# Patient Record
Sex: FEMALE | Race: BLACK OR AFRICAN AMERICAN | NOT HISPANIC OR LATINO | Employment: FULL TIME | URBAN - METROPOLITAN AREA
[De-identification: names, ages, dates, MRNs, and addresses within clinical notes are randomized per-mention and may not be internally consistent; named-entity substitution may affect disease eponyms.]

---

## 2019-12-02 ENCOUNTER — OFFICE VISIT (OUTPATIENT)
Dept: GASTROENTEROLOGY | Facility: CLINIC | Age: 69
End: 2019-12-02
Payer: COMMERCIAL

## 2019-12-02 VITALS
HEART RATE: 74 BPM | SYSTOLIC BLOOD PRESSURE: 120 MMHG | DIASTOLIC BLOOD PRESSURE: 72 MMHG | WEIGHT: 175.94 LBS | HEIGHT: 65 IN | BODY MASS INDEX: 29.31 KG/M2

## 2019-12-02 DIAGNOSIS — Z90.49 STATUS POST CHOLECYSTECTOMY: ICD-10-CM

## 2019-12-02 DIAGNOSIS — R93.2 ABNORMAL MRI, LIVER: ICD-10-CM

## 2019-12-02 DIAGNOSIS — K59.09 CHRONIC CONSTIPATION: ICD-10-CM

## 2019-12-02 DIAGNOSIS — K83.8 DILATED BILE DUCT: Primary | ICD-10-CM

## 2019-12-02 PROBLEM — M81.0 OSTEOPOROSIS: Status: ACTIVE | Noted: 2017-02-13

## 2019-12-02 PROBLEM — M54.50 LOW BACK PAIN: Status: ACTIVE | Noted: 2019-12-02

## 2019-12-02 PROBLEM — M54.17 LUMBOSACRAL RADICULOPATHY: Status: ACTIVE | Noted: 2019-09-19

## 2019-12-02 PROBLEM — Q76.49 TRANSITIONAL VERTEBRA: Status: ACTIVE | Noted: 2019-12-02

## 2019-12-02 PROBLEM — M54.2 NECK PAIN: Status: ACTIVE | Noted: 2019-12-02

## 2019-12-02 PROBLEM — Z91.09 ENVIRONMENTAL ALLERGIES: Status: ACTIVE | Noted: 2019-12-02

## 2019-12-02 PROBLEM — G47.62 NOCTURNAL LEG CRAMPS: Status: ACTIVE | Noted: 2019-02-16

## 2019-12-02 PROBLEM — I10 ESSENTIAL HYPERTENSION: Status: ACTIVE | Noted: 2018-10-30

## 2019-12-02 PROBLEM — M47.812 CERVICAL SPONDYLOSIS: Status: ACTIVE | Noted: 2019-12-02

## 2019-12-02 PROCEDURE — 1159F MED LIST DOCD IN RCRD: CPT | Mod: S$GLB,,, | Performed by: INTERNAL MEDICINE

## 2019-12-02 PROCEDURE — 99999 PR PBB SHADOW E&M-NEW PATIENT-LVL III: ICD-10-PCS | Mod: PBBFAC,,, | Performed by: INTERNAL MEDICINE

## 2019-12-02 PROCEDURE — 1101F PT FALLS ASSESS-DOCD LE1/YR: CPT | Mod: CPTII,S$GLB,, | Performed by: INTERNAL MEDICINE

## 2019-12-02 PROCEDURE — 99999 PR PBB SHADOW E&M-NEW PATIENT-LVL III: CPT | Mod: PBBFAC,,, | Performed by: INTERNAL MEDICINE

## 2019-12-02 PROCEDURE — 1101F PR PT FALLS ASSESS DOC 0-1 FALLS W/OUT INJ PAST YR: ICD-10-PCS | Mod: CPTII,S$GLB,, | Performed by: INTERNAL MEDICINE

## 2019-12-02 PROCEDURE — 99204 PR OFFICE/OUTPT VISIT, NEW, LEVL IV, 45-59 MIN: ICD-10-PCS | Mod: S$GLB,,, | Performed by: INTERNAL MEDICINE

## 2019-12-02 PROCEDURE — 1125F PR PAIN SEVERITY QUANTIFIED, PAIN PRESENT: ICD-10-PCS | Mod: S$GLB,,, | Performed by: INTERNAL MEDICINE

## 2019-12-02 PROCEDURE — 99204 OFFICE O/P NEW MOD 45 MIN: CPT | Mod: S$GLB,,, | Performed by: INTERNAL MEDICINE

## 2019-12-02 PROCEDURE — 1125F AMNT PAIN NOTED PAIN PRSNT: CPT | Mod: S$GLB,,, | Performed by: INTERNAL MEDICINE

## 2019-12-02 PROCEDURE — 1159F PR MEDICATION LIST DOCUMENTED IN MEDICAL RECORD: ICD-10-PCS | Mod: S$GLB,,, | Performed by: INTERNAL MEDICINE

## 2019-12-02 RX ORDER — QUETIAPINE 200 MG/1
TABLET, FILM COATED, EXTENDED RELEASE ORAL
COMMUNITY
Start: 2019-05-01

## 2019-12-02 RX ORDER — LOSARTAN POTASSIUM 50 MG/1
TABLET ORAL
COMMUNITY

## 2019-12-02 RX ORDER — LEVOTHYROXINE SODIUM 50 UG/1
TABLET ORAL
COMMUNITY
Start: 2019-05-06

## 2019-12-02 RX ORDER — AZELASTINE 1 MG/ML
SPRAY, METERED NASAL
COMMUNITY
Start: 2019-10-17

## 2019-12-02 RX ORDER — QUETIAPINE FUMARATE 50 MG/1
TABLET, FILM COATED ORAL
COMMUNITY
Start: 2019-05-01

## 2019-12-02 RX ORDER — NEBIVOLOL 5 MG/1
TABLET ORAL
COMMUNITY
Start: 2019-08-12

## 2019-12-02 RX ORDER — MECLIZINE HCL 12.5 MG 12.5 MG/1
TABLET ORAL
COMMUNITY
Start: 2019-07-26

## 2019-12-02 RX ORDER — ASPIRIN 81 MG/1
81 TABLET ORAL
COMMUNITY

## 2019-12-02 RX ORDER — METHOCARBAMOL 500 MG/1
TABLET, FILM COATED ORAL
COMMUNITY
Start: 2019-10-31

## 2019-12-02 RX ORDER — FLUTICASONE PROPIONATE AND SALMETEROL 50; 250 UG/1; UG/1
POWDER RESPIRATORY (INHALATION)
COMMUNITY
Start: 2019-11-27

## 2019-12-02 RX ORDER — POTASSIUM CHLORIDE 20 MEQ/1
TABLET, EXTENDED RELEASE ORAL
COMMUNITY
Start: 2018-10-25

## 2019-12-02 RX ORDER — FLUTICASONE PROPIONATE 50 MCG
SPRAY, SUSPENSION (ML) NASAL
Refills: 6 | COMMUNITY
Start: 2019-10-17

## 2019-12-02 RX ORDER — ONDANSETRON 4 MG/1
TABLET, ORALLY DISINTEGRATING ORAL
COMMUNITY

## 2019-12-02 NOTE — PROGRESS NOTES
Subjective:       Patient ID: Aubree Gupta is a 69 y.o. female.    Chief Complaint: Fatty Liver    The patient is here regarding a 2nd opinion concerning findings on studies of her liver.  She underwent an MRI of her lumbar spine on August 18th of this year, and an incidental finding was suggestive of hepatomegaly.  Clinical correlation was recommended to be done which led her to a GI consultation.  Subsequently an imaging study was done with the patient stating she had an MRI done of her liver and biliary tree.  According to the patient, there were no significant findings of the liver; explained to her that the initial suggestion of hepatomegaly may have occurred because of positioning done for the spine shot likely gave artifactual picture of the relationships with her liver.    The patient is having concerns now, because the gastroenterologist informed her that her bile ducts were dilated.  We reviewed her labs done going back to 2017, and all her liver function studies are well within the normal range.  It was also explained to her, that her dilated bile ducts were likely a result of her post cholecystectomy condition and not due to any pathology of her liver or bile ducts.  It has been recommended that she have an abdominal ultrasound, however, it is difficult for me to see that there would be any pathology that would be demonstrated under this condition that would not have appeared on the evaluation she is already had.  This is particularly true, since the findings she described were based on incidental findings and not symptoms.    Review of Systems   Constitutional: Positive for chills and fatigue. Negative for activity change, appetite change, diaphoresis, fever and unexpected weight change.   HENT: Positive for postnasal drip. Negative for congestion, ear discharge, ear pain, hearing loss, nosebleeds and tinnitus.    Eyes: Positive for visual disturbance. Negative for photophobia.   Respiratory:  Positive for cough and wheezing. Negative for apnea, choking, chest tightness and shortness of breath.    Cardiovascular: Negative for chest pain, palpitations and leg swelling.   Gastrointestinal: Positive for constipation. Negative for abdominal distention, abdominal pain, anal bleeding, blood in stool, diarrhea, nausea, rectal pain and vomiting.   Genitourinary: Negative for difficulty urinating, dyspareunia, dysuria, flank pain, frequency, hematuria, menstrual problem, pelvic pain, urgency, vaginal bleeding and vaginal discharge.   Musculoskeletal: Positive for back pain. Negative for arthralgias, gait problem, joint swelling, myalgias and neck stiffness.        Joint stiffness  Changes in coordination   Skin: Negative for pallor and rash.   Neurological: Negative for dizziness, tremors, seizures, syncope, speech difficulty, weakness, numbness and headaches.   Hematological: Negative for adenopathy.   Psychiatric/Behavioral: Negative for agitation, confusion, hallucinations, sleep disturbance and suicidal ideas.       Objective:      Physical Exam   Constitutional: She is oriented to person, place, and time. She appears well-developed and well-nourished.   HENT:   Head: Normocephalic and atraumatic.   Bilateral turbinate congestion   Eyes: Pupils are equal, round, and reactive to light. Conjunctivae and EOM are normal. Right eye exhibits no discharge. Left eye exhibits no discharge. No scleral icterus.   Neck: Normal range of motion. Neck supple. No JVD present. No thyromegaly present.   Cardiovascular: Normal rate, regular rhythm, normal heart sounds and intact distal pulses. Exam reveals no gallop and no friction rub.   No murmur heard.  Pulmonary/Chest: Effort normal and breath sounds normal. No respiratory distress. She has no wheezes. She has no rales. She exhibits no tenderness.   Abdominal: Soft. Bowel sounds are normal. She exhibits no distension and no mass. There is no tenderness. There is no rebound  and no guarding.   Musculoskeletal: Normal range of motion. She exhibits no edema.   Lymphadenopathy:     She has no cervical adenopathy.   Neurological: She is alert and oriented to person, place, and time. She has normal reflexes. She exhibits normal muscle tone. Coordination normal.   Skin: Skin is warm and dry. No rash noted. No erythema. No pallor.   Psychiatric: She has a normal mood and affect. Her behavior is normal. Judgment and thought content normal.   Vitals reviewed.      Assessment:   Dilated bile ducts  Status post cholecystectomy  Abnormal MRI  Chronic constipation  Plan:   Request official documents of imaging  With information we have, would not recommend any further workup.

## 2021-07-21 ENCOUNTER — OFFICE VISIT (OUTPATIENT)
Dept: ALLERGY | Facility: CLINIC | Age: 71
End: 2021-07-21
Payer: COMMERCIAL

## 2021-07-21 ENCOUNTER — LAB VISIT (OUTPATIENT)
Dept: LAB | Facility: HOSPITAL | Age: 71
End: 2021-07-21
Payer: COMMERCIAL

## 2021-07-21 VITALS
HEART RATE: 61 BPM | DIASTOLIC BLOOD PRESSURE: 64 MMHG | BODY MASS INDEX: 27.96 KG/M2 | TEMPERATURE: 97 F | SYSTOLIC BLOOD PRESSURE: 112 MMHG | HEIGHT: 64 IN | WEIGHT: 163.81 LBS

## 2021-07-21 DIAGNOSIS — R25.2 MUSCLE CRAMPS: Primary | ICD-10-CM

## 2021-07-21 DIAGNOSIS — R25.2 MUSCLE CRAMPS: ICD-10-CM

## 2021-07-21 DIAGNOSIS — J31.0 RHINITIS, UNSPECIFIED TYPE: ICD-10-CM

## 2021-07-21 PROCEDURE — 86003 ALLG SPEC IGE CRUDE XTRC EA: CPT | Performed by: ALLERGY & IMMUNOLOGY

## 2021-07-21 PROCEDURE — 82785 ASSAY OF IGE: CPT | Performed by: ALLERGY & IMMUNOLOGY

## 2021-07-21 PROCEDURE — 99999 PR PBB SHADOW E&M-EST. PATIENT-LVL III: CPT | Mod: PBBFAC,,, | Performed by: ALLERGY & IMMUNOLOGY

## 2021-07-21 PROCEDURE — 95004 PERQ TESTS W/ALRGNC XTRCS: CPT | Mod: S$GLB,,, | Performed by: ALLERGY & IMMUNOLOGY

## 2021-07-21 PROCEDURE — 84443 ASSAY THYROID STIM HORMONE: CPT | Performed by: ALLERGY & IMMUNOLOGY

## 2021-07-21 PROCEDURE — 99999 PR PBB SHADOW E&M-EST. PATIENT-LVL III: ICD-10-PCS | Mod: PBBFAC,,, | Performed by: ALLERGY & IMMUNOLOGY

## 2021-07-21 PROCEDURE — 99204 PR OFFICE/OUTPT VISIT, NEW, LEVL IV, 45-59 MIN: ICD-10-PCS | Mod: 25,S$GLB,, | Performed by: ALLERGY & IMMUNOLOGY

## 2021-07-21 PROCEDURE — 36415 COLL VENOUS BLD VENIPUNCTURE: CPT | Performed by: ALLERGY & IMMUNOLOGY

## 2021-07-21 PROCEDURE — 80048 BASIC METABOLIC PNL TOTAL CA: CPT | Performed by: ALLERGY & IMMUNOLOGY

## 2021-07-21 PROCEDURE — 95004 PR ALLERGY SKIN TESTS,ALLERGENS: ICD-10-PCS | Mod: S$GLB,,, | Performed by: ALLERGY & IMMUNOLOGY

## 2021-07-21 PROCEDURE — 99204 OFFICE O/P NEW MOD 45 MIN: CPT | Mod: 25,S$GLB,, | Performed by: ALLERGY & IMMUNOLOGY

## 2021-07-21 PROCEDURE — 86003 ALLG SPEC IGE CRUDE XTRC EA: CPT | Mod: 59 | Performed by: ALLERGY & IMMUNOLOGY

## 2021-07-22 LAB
ANION GAP SERPL CALC-SCNC: 9 MMOL/L (ref 8–16)
BUN SERPL-MCNC: 18 MG/DL (ref 8–23)
CALCIUM SERPL-MCNC: 9.7 MG/DL (ref 8.7–10.5)
CHLORIDE SERPL-SCNC: 106 MMOL/L (ref 95–110)
CO2 SERPL-SCNC: 27 MMOL/L (ref 23–29)
CREAT SERPL-MCNC: 1 MG/DL (ref 0.5–1.4)
EST. GFR  (AFRICAN AMERICAN): >60 ML/MIN/1.73 M^2
EST. GFR  (NON AFRICAN AMERICAN): 57.6 ML/MIN/1.73 M^2
GLUCOSE SERPL-MCNC: 84 MG/DL (ref 70–110)
IGE SERPL-ACNC: <35 IU/ML (ref 0–100)
POTASSIUM SERPL-SCNC: 4.4 MMOL/L (ref 3.5–5.1)
SODIUM SERPL-SCNC: 142 MMOL/L (ref 136–145)
TSH SERPL DL<=0.005 MIU/L-ACNC: 2.51 UIU/ML (ref 0.4–4)

## 2021-07-23 LAB — AMER SYCAMORE IGE QN: <0.35 KU/L

## 2021-07-26 LAB
A ALTERNATA IGE QN: <0.1 KU/L
A FUMIGATUS IGE QN: <0.1 KU/L
ALLERGEN BOXELDER MAPLE TREE IGE: <0.1 KU/L
ALLERGEN CHAETOMIUM GLOBOSUM IGE: <0.1 KU/L
ALLERGEN MAPLE (BOX ELDER) CLASS: NORMAL
ALLERGEN MULBERRY CLASS: NORMAL
ALLERGEN MULBERRY TREE IGE: <0.1 KU/L
ALLERGEN WHITE ASH TREE IGE: <0.1 KU/L
ALLERGEN WHITE PINE TREE IGE: <0.1 KU/L
BAHIA GRASS IGE QN: <0.1 KU/L
BALD CYPRESS IGE QN: <0.1 KU/L
C HERBARUM IGE QN: <0.1 KU/L
C LUNATA IGE QN: <0.1 KU/L
CAT DANDER IGE QN: <0.1 KU/L
CHAETOMIUM GLOB. CLASS: NORMAL
COCKLEBUR IGE QN: <0.1 KU/L
COCKSFOOT IGE QN: <0.1 KU/L
COMMON RAGWEED IGE QN: <0.1 KU/L
COTTONWOOD IGE QN: <0.1 KU/L
D FARINAE IGE QN: <0.1 KU/L
D PTERONYSS IGE QN: <0.1 KU/L
DEPRECATED A ALTERNATA IGE RAST QL: NORMAL
DEPRECATED A FUMIGATUS IGE RAST QL: NORMAL
DEPRECATED BAHIA GRASS IGE RAST QL: NORMAL
DEPRECATED BALD CYPRESS IGE RAST QL: NORMAL
DEPRECATED C HERBARUM IGE RAST QL: NORMAL
DEPRECATED C LUNATA IGE RAST QL: NORMAL
DEPRECATED CAT DANDER IGE RAST QL: NORMAL
DEPRECATED COCKLEBUR IGE RAST QL: NORMAL
DEPRECATED COCKSFOOT IGE RAST QL: NORMAL
DEPRECATED COMMON RAGWEED IGE RAST QL: NORMAL
DEPRECATED COTTONWOOD IGE RAST QL: NORMAL
DEPRECATED D FARINAE IGE RAST QL: NORMAL
DEPRECATED D PTERONYSS IGE RAST QL: NORMAL
DEPRECATED DOG DANDER IGE RAST QL: NORMAL
DEPRECATED ELDER IGE RAST QL: NORMAL
DEPRECATED ENGL PLANTAIN IGE RAST QL: NORMAL
DEPRECATED GUM-TREE IGE RAST QL: NORMAL
DEPRECATED HACKBERRY TREE IGE RAST QL: NORMAL
DEPRECATED JOHNSON GRASS IGE RAST QL: NORMAL
DEPRECATED KENT BLUE GRASS IGE RAST QL: NORMAL
DEPRECATED LONDON PLANE IGE RAST QL: NORMAL
DEPRECATED MUGWORT IGE RAST QL: NORMAL
DEPRECATED NETTLE IGE RAST QL: NORMAL
DEPRECATED PECAN/HICK TREE IGE RAST QL: NORMAL
DEPRECATED PER RYE GRASS IGE RAST QL: NORMAL
DEPRECATED PRIVET IGE RAST QL: NORMAL
DEPRECATED RED TOP GRASS IGE RAST QL: NORMAL
DEPRECATED ROACH IGE RAST QL: NORMAL
DEPRECATED SALTWORT IGE RAST QL: NORMAL
DEPRECATED SHEEP SORREL IGE RAST QL: NORMAL
DEPRECATED SILVER BIRCH IGE RAST QL: NORMAL
DEPRECATED TIMOTHY IGE RAST QL: NORMAL
DEPRECATED WHITE OAK IGE RAST QL: NORMAL
DEPRECATED WILLOW IGE RAST QL: NORMAL
DOG DANDER IGE QN: <0.1 KU/L
ELDER IGE QN: <0.1 KU/L
ELM CEDAR CLASS: NORMAL
ELM CEDAR, IGE: <0.1 KU/L
ENGL PLANTAIN IGE QN: <0.1 KU/L
GUM-TREE IGE QN: <0.1 KU/L
HACKBERRY TREE IGE QN: <0.1 KU/L
JOHNSON GRASS IGE QN: <0.1 KU/L
KENT BLUE GRASS IGE QN: <0.1 KU/L
LONDON PLANE IGE QN: <0.1 KU/L
MUGWORT IGE QN: <0.1 KU/L
NETTLE IGE QN: <0.1 KU/L
PECAN/HICK TREE IGE QN: <0.1 KU/L
PER RYE GRASS IGE QN: <0.1 KU/L
PRIVET IGE QN: <0.1 KU/L
RED TOP GRASS IGE QN: <0.1 KU/L
ROACH IGE QN: <0.1 KU/L
SALTWORT IGE QN: <0.1 KU/L
SHEEP SORREL IGE QN: <0.1 KU/L
SILVER BIRCH IGE QN: <0.1 KU/L
STEMPHYLIUM HERBARUM CLASS: NORMAL
STEMPHYLLIUM, IGE: <0.1 KU/L
TIMOTHY IGE QN: <0.1 KU/L
WHITE ASH CLASS: NORMAL
WHITE OAK IGE QN: <0.1 KU/L
WHITE PINE CLASS: NORMAL
WILLOW IGE QN: <0.1 KU/L

## 2021-08-03 ENCOUNTER — TELEPHONE (OUTPATIENT)
Dept: ALLERGY | Facility: CLINIC | Age: 71
End: 2021-08-03

## 2021-08-18 ENCOUNTER — TELEPHONE (OUTPATIENT)
Dept: ALLERGY | Facility: CLINIC | Age: 71
End: 2021-08-18

## 2021-09-08 ENCOUNTER — TELEPHONE (OUTPATIENT)
Dept: RHEUMATOLOGY | Facility: CLINIC | Age: 71
End: 2021-09-08

## 2022-02-04 ENCOUNTER — HOSPITAL ENCOUNTER (OUTPATIENT)
Dept: RADIOLOGY | Facility: HOSPITAL | Age: 72
Discharge: HOME | End: 2022-02-04
Attending: INTERNAL MEDICINE
Payer: COMMERCIAL

## 2022-02-04 ENCOUNTER — OFFICE VISIT (OUTPATIENT)
Dept: RHEUMATOLOGY | Facility: CLINIC | Age: 72
End: 2022-02-04
Payer: COMMERCIAL

## 2022-02-04 VITALS
HEART RATE: 55 BPM | BODY MASS INDEX: 26.98 KG/M2 | HEIGHT: 64 IN | DIASTOLIC BLOOD PRESSURE: 68 MMHG | SYSTOLIC BLOOD PRESSURE: 125 MMHG | WEIGHT: 158.06 LBS

## 2022-02-04 DIAGNOSIS — Z71.89 COUNSELING ON HEALTH PROMOTION AND DISEASE PREVENTION: ICD-10-CM

## 2022-02-04 DIAGNOSIS — M65.30 TRIGGER FINGER, UNSPECIFIED FINGER, UNSPECIFIED LATERALITY: ICD-10-CM

## 2022-02-04 DIAGNOSIS — M15.9 PRIMARY OSTEOARTHRITIS INVOLVING MULTIPLE JOINTS: ICD-10-CM

## 2022-02-04 DIAGNOSIS — M15.9 PRIMARY OSTEOARTHRITIS INVOLVING MULTIPLE JOINTS: Primary | ICD-10-CM

## 2022-02-04 PROCEDURE — 3288F FALL RISK ASSESSMENT DOCD: CPT | Mod: CPTII,S$GLB,, | Performed by: INTERNAL MEDICINE

## 2022-02-04 PROCEDURE — 3074F PR MOST RECENT SYSTOLIC BLOOD PRESSURE < 130 MM HG: ICD-10-PCS | Mod: CPTII,S$GLB,, | Performed by: INTERNAL MEDICINE

## 2022-02-04 PROCEDURE — 3078F DIAST BP <80 MM HG: CPT | Mod: CPTII,S$GLB,, | Performed by: INTERNAL MEDICINE

## 2022-02-04 PROCEDURE — 99204 OFFICE O/P NEW MOD 45 MIN: CPT | Mod: S$GLB,,, | Performed by: INTERNAL MEDICINE

## 2022-02-04 PROCEDURE — 99999 PR PBB SHADOW E&M-EST. PATIENT-LVL V: CPT | Mod: PBBFAC,,, | Performed by: INTERNAL MEDICINE

## 2022-02-04 PROCEDURE — 73630 X-RAY EXAM OF FOOT: CPT | Mod: TC,50

## 2022-02-04 PROCEDURE — 3008F PR BODY MASS INDEX (BMI) DOCUMENTED: ICD-10-PCS | Mod: CPTII,S$GLB,, | Performed by: INTERNAL MEDICINE

## 2022-02-04 PROCEDURE — 73630 X-RAY EXAM OF FOOT: CPT | Mod: 26,,, | Performed by: RADIOLOGY

## 2022-02-04 PROCEDURE — 3074F SYST BP LT 130 MM HG: CPT | Mod: CPTII,S$GLB,, | Performed by: INTERNAL MEDICINE

## 2022-02-04 PROCEDURE — 73630 XR FOOT COMPLETE 3 VIEW BILATERAL: ICD-10-PCS | Mod: 26,,, | Performed by: RADIOLOGY

## 2022-02-04 PROCEDURE — 3078F PR MOST RECENT DIASTOLIC BLOOD PRESSURE < 80 MM HG: ICD-10-PCS | Mod: CPTII,S$GLB,, | Performed by: INTERNAL MEDICINE

## 2022-02-04 PROCEDURE — 3288F PR FALLS RISK ASSESSMENT DOCUMENTED: ICD-10-PCS | Mod: CPTII,S$GLB,, | Performed by: INTERNAL MEDICINE

## 2022-02-04 PROCEDURE — 99999 PR PBB SHADOW E&M-EST. PATIENT-LVL V: ICD-10-PCS | Mod: PBBFAC,,, | Performed by: INTERNAL MEDICINE

## 2022-02-04 PROCEDURE — 73130 X-RAY EXAM OF HAND: CPT | Mod: 26,,, | Performed by: RADIOLOGY

## 2022-02-04 PROCEDURE — 99204 PR OFFICE/OUTPT VISIT, NEW, LEVL IV, 45-59 MIN: ICD-10-PCS | Mod: S$GLB,,, | Performed by: INTERNAL MEDICINE

## 2022-02-04 PROCEDURE — 1101F PT FALLS ASSESS-DOCD LE1/YR: CPT | Mod: CPTII,S$GLB,, | Performed by: INTERNAL MEDICINE

## 2022-02-04 PROCEDURE — 1159F PR MEDICATION LIST DOCUMENTED IN MEDICAL RECORD: ICD-10-PCS | Mod: CPTII,S$GLB,, | Performed by: INTERNAL MEDICINE

## 2022-02-04 PROCEDURE — 73130 X-RAY EXAM OF HAND: CPT | Mod: TC,50

## 2022-02-04 PROCEDURE — 1126F AMNT PAIN NOTED NONE PRSNT: CPT | Mod: CPTII,S$GLB,, | Performed by: INTERNAL MEDICINE

## 2022-02-04 PROCEDURE — 1101F PR PT FALLS ASSESS DOC 0-1 FALLS W/OUT INJ PAST YR: ICD-10-PCS | Mod: CPTII,S$GLB,, | Performed by: INTERNAL MEDICINE

## 2022-02-04 PROCEDURE — 1126F PR PAIN SEVERITY QUANTIFIED, NO PAIN PRESENT: ICD-10-PCS | Mod: CPTII,S$GLB,, | Performed by: INTERNAL MEDICINE

## 2022-02-04 PROCEDURE — 73130 XR HAND COMPLETE 3 VIEWS BILATERAL: ICD-10-PCS | Mod: 26,,, | Performed by: RADIOLOGY

## 2022-02-04 PROCEDURE — 3008F BODY MASS INDEX DOCD: CPT | Mod: CPTII,S$GLB,, | Performed by: INTERNAL MEDICINE

## 2022-02-04 PROCEDURE — 1159F MED LIST DOCD IN RCRD: CPT | Mod: CPTII,S$GLB,, | Performed by: INTERNAL MEDICINE

## 2022-02-04 RX ORDER — LIFITEGRAST 50 MG/ML
SOLUTION/ DROPS OPHTHALMIC
COMMUNITY

## 2022-02-04 RX ORDER — LATANOPROST 50 UG/ML
1 SOLUTION/ DROPS OPHTHALMIC NIGHTLY
COMMUNITY

## 2022-02-04 NOTE — PROGRESS NOTES
RHEUMATOLOGY OUTPATIENT CLINIC NOTE    2022    Attending Rheumatologist: Tristian Lauren  Primary Care Provider/Physician Requesting Consultation: Primary Doctor No   Chief Complaint/Reason For Consultation:  Osteoarthritis      Subjective:     Aubree Gupta is a 71 y.o. Black or  female with hand arthralgias referred for rheumatic evaluation.  Main concern of chronic hand pain with prominent mechanical pattern and features of trigger finger.  Single episode of right 5th PIP joint swelling without any particular precipitating event.  Denies past medical history of inflammatory arthritis or gouty arthropathy.  Denies precipitation joint pain/swelling with particular food beverage intake.  Currently without rashes or fever.  Denies acute respiratory symptoms, acute /GI complaints.    Review of Systems   Constitutional: Negative for fever and malaise/fatigue.   Eyes: Negative for pain.   Musculoskeletal: Positive for joint pain.   Skin: Negative for rash.   Neurological: Negative for focal weakness.       Chronic comorbid conditions affecting medical decision making today:  Past Medical History:   Diagnosis Date    Cholelithiasis     Chronic bronchitis     HBP (high blood pressure)     Heart disease     Hypothyroid     Iron deficiency anemia     Nephritis due to bacterial infection     strept    Pneumonia     Spondylosis of cervical spine      Past Surgical History:   Procedure Laterality Date    APPENDECTOMY       SECTION, LOW TRANSVERSE      CHOLECYSTECTOMY      LAPAROSCOPIC SALPINGO-OOPHORECTOMY Right     TONSILLECTOMY       Family History   Problem Relation Age of Onset    Heart disease Mother     Kidney disease Father     Breast cancer Sister     Gallbladder disease Brother         cancer     Social History     Substance and Sexual Activity   Alcohol Use Never     Social History     Tobacco Use   Smoking Status Never Smoker   Smokeless Tobacco Never Used      Social History     Substance and Sexual Activity   Drug Use Never       Current Outpatient Medications:     ADVAIR DISKUS 250-50 mcg/dose diskus inhaler, , Disp: , Rfl:     aspirin (ECOTRIN) 81 MG EC tablet, Take 81 mg by mouth., Disp: , Rfl:     azelastine (ASTELIN) 137 mcg (0.1 %) nasal spray, azelastine 137 mcg (0.1 %) nasal spray aerosol, Disp: , Rfl:     ferrous sulfate 27 mg iron Tab, Take by mouth., Disp: , Rfl:     fluticasone propionate (FLONASE) 50 mcg/actuation nasal spray, SHAKE LQ AND U 1 SPR IEN D, Disp: , Rfl: 6    latanoprost 0.005 % ophthalmic solution, 1 drop every evening., Disp: , Rfl:     levothyroxine (SYNTHROID) 50 MCG tablet, levothyroxine 50 mcg tablet, Disp: , Rfl:     lifitegrast (XIIDRA) 5 % Dpet, Apply to eye., Disp: , Rfl:     losartan (COZAAR) 50 MG tablet, losartan 50 mg tablet, Disp: , Rfl:     methocarbamol (ROBAXIN) 500 MG Tab, methocarbamol 500 mg tablet, Disp: , Rfl:     nebivoloL (BYSTOLIC) 5 MG Tab, Bystolic 5 mg tablet, Disp: , Rfl:     ondansetron (ZOFRAN-ODT) 4 MG TbDL, ondansetron 4 mg disintegrating tablet, Disp: , Rfl:     potassium chloride SA (K-DUR,KLOR-CON) 20 MEQ tablet, potassium chloride ER 20 mEq tablet,extended release(part/cryst), Disp: , Rfl:     QUEtiapine (SEROQUEL) 50 MG tablet, quetiapine 50 mg tablet, Disp: , Rfl:     quetiapine 200 mg oral tb24 (SEROQUEL XR) 200 MG Tb24, quetiapine  mg tablet,extended release 24 hr, Disp: , Rfl:     meclizine (ANTIVERT) 12.5 mg tablet, , Disp: , Rfl:      Objective:     Vitals:    02/04/22 1325   BP: 125/68   Pulse: (!) 55     Physical Exam   Constitutional: She does not appear ill.   Eyes: Right eye exhibits no discharge.   Pulmonary/Chest: No respiratory distress.   Musculoskeletal:      Comments: Able to make full fist without difficulty from pain or stiffness.  Visual intermittent triggering of ring finger, predominantly on right side.  Not significant tenderness to palpation or significant  nodules felt.  Prominent appearance of right 5th PIP joint without significant squeeze tenderness or warmth on exam.       Reviewed available old and all outside pertinent medical records available.    All lab results personally reviewed and interpreted by me.  No results found for: WBC, HGB, HCT, MCV, RDW, PLT, NEUTROABS, LYMPHOABS, EOSINOABS, NEUTROPCT, LYMPHOPCT, EOSINOPCT, DIFFTYPE, RBCMORPHOLOG, PLTEST    Lab Results   Component Value Date     07/21/2021    K 4.4 07/21/2021     07/21/2021    CO2 27 07/21/2021    GLU 84 07/21/2021    BUN 18 07/21/2021    CALCIUM 9.7 07/21/2021       No results found for: COLORU, APPEARANCEUA, SPECGRAV, PHUR, PHUA, PROTEINUA, GLUCOSEU, KETONESU, BLOODU, LEUKOCYTESUR, NITRITE, UROBILINOGEN    No results found for: PTH    No results found for: URICACID    No results found for: CRP, ERYTHROCYTES    No results found for: ANATITER    No components found for: TSPOTTB,  QUANTIFERON     ASSESSMENT:     Chronic arthralgias with prominent mechanical pattern.  Features of trigger finger on history and exam.  Prominent Mony's nodule with no obvious synovitis noted on exam.  Episode of severe tenderness and swelling reported by patient several months ago without recurrence.  Referral to Hand OT provided.  Topical therapy and activity refer occasion suggested.  Finger splint use of Voltaren gel for painful triggering.  Consider local corticosteroid injection for pain relief, deferred by patient on this encounter.    PLAN:     1. Primary osteoarthritis involving multiple joints    - X-Ray Hand Complete Bilateral; Future  - Uric Acid; Future    2. Trigger finger, unspecified finger, unspecified laterality    - C-Reactive Protein; Standing  - Sedimentation rate; Standing  - Rheumatoid Factor; Future  - Comprehensive Metabolic Panel; Standing  - X-Ray Hand Complete Bilateral; Future  - Cyclic Citrullinated Peptide Antibody, IgG; Future    3. Counseling on health promotion and  disease prevention      No follow-ups on file.  RTC 4 months    Disclaimer: This note is prepared using voice recognition software and as such is likely to have errors and has not been proof read. Please contact me for questions.     Tristian Lauren M.D.

## 2022-02-04 NOTE — PATIENT INSTRUCTIONS
Patient Education       Trigger Finger Release   Why is this procedure done?   Trigger finger release is a surgery to treat trigger finger. With trigger finger, you have a problem with straightening your finger or thumb. It seems as if your finger is stuck in a bent position. It may snap when it is straightened.  Your fingers are made up of many small bones. Muscles help move your fingers and the muscles are attached to the bones with strong bands of tissue called tendons. If the area around the tendon, called the tendon sheath, becomes swollen, the tendon is not able to move as well as it should. Then, your finger may become stiff and have problems moving.  Surgery to treat trigger finger cuts the tendon sheath around the tendon, making it wider so the tendon is able to move freely.     What will the results be?   Your finger will not become stuck in a bent position.  What happens before the procedure?   · Your doctor will ask you about your health history. Talk to the doctor about:  ? All the drugs you are taking. Be sure to include all prescription, over the counter, and herbal supplements. Tell the doctor if you have any drug allergy. Bring a list of drugs you take with you.  ? Any bleeding problems. Be sure to tell your doctor if you are taking any drugs that may cause bleeding. Some of these are warfarin, rivaroxaban, apixaban, ticagrelor, clopidogrel, ketorolac, ibuprofen, naproxen, or aspirin. Certain vitamins and herbs, such as garlic and fish oil, may also add to the risk for bleeding. You may need to stop these drugs as well. Talk to your doctor about them.  ? When you need to stop eating or drinking before your procedure.  · You will not be allowed to drive right away after the procedure. Ask a family member or a friend to drive you home.  What happens during the procedure?   · Once you are in the operating room, the staff will put an IV in your arm to give you fluids and drugs. You will be given a drug  to make you sleepy. It will also help you stay pain free during the surgery. Sometimes, the doctor will also give you a special drug to make your hand numb for the surgery.  · Your doctor can do the surgery in one of a few ways.  ? Open surgery ? The doctor will make small cut on the palm of your hand near your trigger finger. The area around the tendon is loosened.  ? With the tip of a needle ? The doctor will use a needle to loosen the area around the tendon.  · Your doctor will close your cut with stitches if needed and place a bandage on the area.  · The surgery may take 30 minutes to 1 hour.  What happens after the procedure?   · You will go to the Recovery Room or Post Anesthesia Care Unit (PACU) and the staff will watch you closely. Your doctor will give you drugs for pain.  · You may not have any feeling in your arm and hand after surgery if the doctor made it numb. You may need to use a sling to help keep your arm raised.  · Most often, you can go home after the procedure.  What drugs may be needed?   The doctor may order drugs to:  · Help with pain and swelling  · Prevent infection  What problems could happen?   · Nerve injury  · Infection  · Bleeding  · Problem isn't fully fixed and finger remains bent  · Develop trigger finger in other fingers or thumb  Where can I learn more?   American Academy of Orthopaedic Surgeons  http://www.orthoinfo.org/topic.cfm?wzqnk=X70357   Last Reviewed Date   2021-03-16  Consumer Information Use and Disclaimer   This information is not specific medical advice and does not replace information you receive from your health care provider. This is only a brief summary of general information. It does NOT include all information about conditions, illnesses, injuries, tests, procedures, treatments, therapies, discharge instructions or life-style choices that may apply to you. You must talk with your health care provider for complete information about your health and treatment  options. This information should not be used to decide whether or not to accept your health care providers advice, instructions or recommendations. Only your health care provider has the knowledge and training to provide advice that is right for you.  Copyright   Copyright © 2021 [employer]. and its affiliates and/or licensors. All rights reserved.  Patient Education       Trigger Finger Release Discharge Instructions   About this topic   Trigger finger release is a surgery to treat trigger finger. With trigger finger, you have a problem with straightening your finger or thumb. It seems as if your finger is stuck in a bent position. It may snap when it is straightened.  Your fingers are made up of many small bones. Muscles help move your fingers and the muscles are attached to the bones with strong bands of tissue called tendons. If the area around the tendon, called the tendon sheath, becomes swollen, the tendon is not able to move as well as it should. Then, your finger may become stiff and have problems moving.  Surgery to treat trigger finger cuts the tendon sheath around the tendon, making it wider so the tendon is able to move freely.     What care is needed at home?   · Ask your doctor what you need to do when you go home. Make sure you understand everything the doctor says. This way you will know what you need to do.  · Prop your hand on pillows to help with swelling.  · Place an ice pack or a bag of frozen peas wrapped in a towel over the painful part. Never put ice right on the skin. Do not leave the ice on more than 10 to 15 minutes at a time.  · Talk to your doctor about how to care for your cut site. Ask your doctor about:  ? When you should go back to the doctor to have your bandages changed  ? How to cover your bandage to take a bath or shower  ? If you need to be careful with lifting things over 10 pounds (4.5 kg)  ? When you may go back to your normal activities like work, exercising, or  driving  · Wash your hands before and after touching your wound or dressing.  · Move your fingers as much as possible in your dressing.  What follow-up care is needed?   · Your doctor may ask you to make visits to the office to check on your progress. Be sure to keep these visits.  · If you have stitches or staples, you will need to have them taken out. Your doctor will often want to do this in 1 to 2 weeks.  What drugs may be needed?   The doctor may order drugs to:  · Help with pain and swelling  · Prevent infection  Will physical activity be limited?   You may have to limit your activity. Talk to your doctor about the right amount of activity for you. You may need to have physical therapy on your hand after surgery.  What problems could happen?   · Nerve injury  · Infection  · Bleeding  · Problem isn't fully fixed and finger remains bent  · Develop trigger finger in other fingers or thumb  When do I need to call the doctor?   · Signs of infection. These include a fever of 100.4°F (38°C) or higher, chills, wound that will not heal.  · Signs of wound infection. These include swelling, redness, warmth around the wound; too much pain when touched; yellowish, greenish, or bloody discharge; foul smell coming from the cut site; cut site opens up.  · More numbness in hand  · A lot of swelling that makes fingers go numb all of a sudden, or turn dark blue. Call for emergency help if this happens.  Teach Back: Helping You Understand   The Teach Back Method helps you understand the information we are giving you. After you talk with the staff, tell them in your own words what you learned. This helps to make sure the staff has described each thing clearly. It also helps to explain things that may have been confusing. Before going home, make sure you can do these:  · I can tell you about my condition.  · I can tell you what may help ease my pain.  · I can tell you what I will do if I have more pain, numbness, or swelling or my  fingers turn blue.  Where can I learn more?   American Academy of Orthopaedic Surgeons  http://www.orthoinfo.org/topic.cfm?aqsst=G93939   Last Reviewed Date   2021-06-28  Consumer Information Use and Disclaimer   This information is not specific medical advice and does not replace information you receive from your health care provider. This is only a brief summary of general information. It does NOT include all information about conditions, illnesses, injuries, tests, procedures, treatments, therapies, discharge instructions or life-style choices that may apply to you. You must talk with your health care provider for complete information about your health and treatment options. This information should not be used to decide whether or not to accept your health care providers advice, instructions or recommendations. Only your health care provider has the knowledge and training to provide advice that is right for you.  Copyright   Copyright © 2021 UpToDate, Inc. and its affiliates and/or licensors. All rights reserved.  Patient Education       Osteoarthritis   The Basics   Written by the doctors and editors at [facility]   What is osteoarthritis? -- Arthritis is a general term that means inflammation of the joints. There are dozens of types of arthritis. Osteoarthritis is the most common type. It often comes with age, and it often affects the hands, knees, and hips.  The place where 2 bones meet is normally covered with a rubbery material called cartilage. This material allows the bones to slide over each other without causing pain. When osteoarthritis sets in, the cartilage begins to break down. As it wears away, the bones in the joint start to rub against each other (figure 1). This can cause pain, stiffness, and swelling (table 1).  What can I do to feel better? -- To ease your symptoms, you can:  · Rest for several minutes when your pain is at its worst - But don't rest too long. That can make your muscles weak and  "your pain worse.  · Lose weight (if you are overweight) - Being overweight puts extra strain on your joints. Your doctor or nurse can talk to you about healthy ways to lose weight.  · Get plenty of physical activity - Having strong muscles takes some of the strain off of your joints. It can reduce your pain in the long run, even if it hurts to do at first. There are lots of different ways to get physical activity. Your doctor or nurse can help you come up with an exercise routine that works for you. They might also suggest you work with a physical therapist (exercise expert).   · Use "assistive devices" if your doctor recommends them - These devices can help keep your joints stable or take weight off them. Examples include shoe inserts, splints, canes, and walkers.  · Use hot or cold packs - Some people find that heat or cold helps relieves pain for a short time.  · Learn about arthritis - Learning about your condition allows you to work with your doctor or nurse to find the things that you are most helpful for you. It can also help you better understand what to expect with your symptoms and treatment.  Can herbs, vitamins, or supplements help? -- There is no strong evidence that supplements of any sort work on arthritis symptoms. That's true even for glucosamine and chondroitin, which are supplements people seem to think help with arthritis. If you want to try any supplements or herbs, check with your doctor or nurse before taking them.  Are there medicines I can take? -- Usually, doctors suggest trying things like physical activity, weight loss, and assistive devices first. If these do not help with pain, they might recommend medicine. Options include medicines that come as pills as well as creams and gels that go on the skin.  In some situations, doctors might suggest shots that go into the joint to relieve pain. But these are not usually used as a main treatment, because they only work for a few weeks.  What " "about surgery? -- When other treatments do not help enough, some people with osteoarthritis get surgery. For instance, some people have surgery to replace a knee or a hip. Surgeons are working on other types of surgery for arthritis, too.  Try different things until you find what works -- The symptoms of osteoarthritis can be hard to handle. But don't lose hope. You might need to try different combinations of medicines, exercises, and devices to find the approach that works for you. But most people do find ways to go back to doing many of things they like to do.  All topics are updated as new evidence becomes available and our peer review process is complete.  This topic retrieved from [employer] on: Sep 21, 2021.  Topic 10615 Version 15.0  Release: 29.4.2 - C29.263  © 2021 UpToDate, Inc. and/or its affiliates. All rights reserved.  figure 1: Knee osteoarthritis     This drawing shows a normal knee joint next to a knee joint with osteoarthritis (OA). In the OA joint, the cartilage covering the ends of the bones roughens and becomes thin, while the bone underneath the cartilage grows thicker. Bony growths called "osteophytes" can form. The space between the bones also becomes narrower.  Graphic 809068 Version 2.0    table 1: Effects of osteoarthritis  Age when symptoms start    Usually after 40   Commonly affected body parts    Neck and lower back   Joint at the base of the thumb   Knuckles in the middle and near the tip of the fingers   Hip   Knee   Ankle joint   Knuckle at the base of the big toe   Less commonly affected body parts    Shoulder   Wrist   Elbow   Knuckles at the base of the fingers   Symptoms    Pain   Stiffness   Crackling or clicking sounds in the joints   Extra bone growth (for example, knuckles that look swollen or knobby)   Decreased range of motion   Problems with the alignment of certain joints   Tenderness to the touch   Graphic 96686 Version 2.0  Consumer Information Use and Disclaimer   This " information is not specific medical advice and does not replace information you receive from your health care provider. This is only a brief summary of general information. It does NOT include all information about conditions, illnesses, injuries, tests, procedures, treatments, therapies, discharge instructions or life-style choices that may apply to you. You must talk with your health care provider for complete information about your health and treatment options. This information should not be used to decide whether or not to accept your health care provider's advice, instructions or recommendations. Only your health care provider has the knowledge and training to provide advice that is right for you. The use of this information is governed by the [employer] End User License Agreement, available at https://www.Shopintoit/en/solutions/[employer]/about/keron.The use of [employer] content is governed by the [employer] Terms of Use. ©2021 UpToDate, Inc. All rights reserved.  Copyright   © 2021 UpToDate, Inc. and/or its affiliates. All rights reserved.  Patient Education       Osteoarthritis Discharge Instructions   About this topic   Osteoarthritis is also called OA. It is swelling of the joints due to wearing down of the cartilage. The cartilage is the protective coating at the end of the bone. It helps the joints glide smoothly with each other during movement. The cartilage most often wears down from years of use.  OA is common among older people. It often affects the joints in the hands, feet, hips, and knees. There is no cure for this condition but the pain can be helped. Pain may be lowered by drugs, lifestyle changes, activity, surgery, and joint fluid supplements.     What care is needed at home?   · Ask your doctor what you need to do when you go home. Make sure you ask questions if you do not understand what the doctor says. This way you will know what you need to do.  · Take all your medicines as ordered for  treating your OA. This may include medicines ordered by your doctor or medicines like ibuprofen or naproxen for swelling and pain. These are nonsteroidal anti-inflammatory drugs (NSAIDS). Some over-the-counter medicines and prescription medicines contain the same drug. Do not take multiple medicines without talking to your doctor first.  · Rest your joint. Prop it on pillows, keeping it above the level of your heart. This may help lessen pain and swelling.  · Ice may help with your pain. Place an ice pack or a bag of frozen vegetables wrapped in a towel over the painful part. Never put ice right on the skin. Do not leave the ice on more than 10 to 15 minutes at a time.  · Talk with your regular doctor about other medicines or lifestyle changes that can help prevent OA flares. Keeping a healthy weight or losing weight in a healthy way may help.  · Try other methods of relieving pain such as massage, relaxation, breathing exercise, yoga, and image and music therapy.  What follow-up care is needed?   · Your doctor may ask you to make visits to the office to check on your progress. Be sure to keep these visits.  · Your doctor may send you to a physical therapist to help improve your motion, balance, and strength.  What drugs may be needed?   The doctor may order drugs to:  · Help with pain and swelling  The doctor may give you a shot of an anti-inflammatory drug called a corticosteroid. This will help with swelling. Talk to your doctor about the risks of this shot.  Will physical activity be limited?   Physical activities may be limited if you are in pain. Ask your doctor about the right amount of activity for you.  What changes to diet are needed?   If you are overweight, ask a dietician for a weight loss plan. Weight loss will lower the stress on your joints.  What problems could happen?   · Not able to move around well  · Low mood  What can be done to prevent this health problem?   There is no known way to prevent  this condition.  When do I need to call the doctor?   · You have a fever of 100.4°F (38°C) or higher or chills.  · Your symptoms are not improving within 2 to 3 days or your pain does not go away completely after a few weeks.  Teach Back: Helping You Understand   The Teach Back Method helps you understand the information we are giving you. After you talk with the staff, tell them in your own words what you learned. This helps to make sure the staff has described each thing clearly. It also helps to explain things that may have been confusing. Before going home, make sure you can do these:  · I can tell you about my condition.  · I can tell you what may help ease my pain.  · I can tell you what I will do if my pain is very bad.  Where can I learn more?   American Academy of Orthopaedic Surgeons  http://orthoinfo.aaos.org/topic.cfm?mxsni=p61964   Arthritis Foundation  http://www.arthritis.org/conditions-treatments/disease-center/osteoarthritis/   National [place] of Arthritis and Musculoskeletal and Skin Diseases  http://www.niams.nih.gov/Health_Info/Osteoarthritis/osteoarthritis_ff.pdf   [employer] [place] of Health ? Senior Health  http://nihseniorhealth.gov/osteoarthritis/whatisosteoarthritis/01.html   Last Reviewed Date   2021-06-18  Consumer Information Use and Disclaimer   This information is not specific medical advice and does not replace information you receive from your health care provider. This is only a brief summary of general information. It does NOT include all information about conditions, illnesses, injuries, tests, procedures, treatments, therapies, discharge instructions or life-style choices that may apply to you. You must talk with your health care provider for complete information about your health and treatment options. This information should not be used to decide whether or not to accept your health care providers advice, instructions or recommendations. Only your health care provider has  the knowledge and training to provide advice that is right for you.  Copyright   Copyright © 2021 [employer] and its affiliates and/or licensors. All rights reserved.

## 2022-02-09 ENCOUNTER — TELEPHONE (OUTPATIENT)
Dept: RHEUMATOLOGY | Facility: CLINIC | Age: 72
End: 2022-02-09
Payer: COMMERCIAL

## 2022-02-09 NOTE — TELEPHONE ENCOUNTER
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

## 2022-02-09 NOTE — TELEPHONE ENCOUNTER
Spoke with pt regarding xray results. Informed her that findings were that of osteoarthritis (wear and tear). She asked about needing to come in for a sooner visit and was told that she could keep her current follow up appointment and contact us if anything worsens.

## 2022-06-03 ENCOUNTER — LAB VISIT (OUTPATIENT)
Dept: LAB | Facility: HOSPITAL | Age: 72
End: 2022-06-03
Attending: INTERNAL MEDICINE
Payer: COMMERCIAL

## 2022-06-03 ENCOUNTER — OFFICE VISIT (OUTPATIENT)
Dept: RHEUMATOLOGY | Facility: CLINIC | Age: 72
End: 2022-06-03
Payer: COMMERCIAL

## 2022-06-03 VITALS
BODY MASS INDEX: 27.28 KG/M2 | DIASTOLIC BLOOD PRESSURE: 69 MMHG | SYSTOLIC BLOOD PRESSURE: 112 MMHG | HEIGHT: 64 IN | WEIGHT: 159.81 LBS | HEART RATE: 64 BPM

## 2022-06-03 DIAGNOSIS — M15.9 PRIMARY OSTEOARTHRITIS INVOLVING MULTIPLE JOINTS: Primary | ICD-10-CM

## 2022-06-03 DIAGNOSIS — M15.9 PRIMARY OSTEOARTHRITIS INVOLVING MULTIPLE JOINTS: ICD-10-CM

## 2022-06-03 LAB
CCP AB SER IA-ACNC: <0.5 U/ML
CRP SERPL-MCNC: 1.6 MG/L (ref 0–8.2)
ERYTHROCYTE [SEDIMENTATION RATE] IN BLOOD BY WESTERGREN METHOD: 4 MM/HR (ref 0–36)
RHEUMATOID FACT SERPL-ACNC: <13 IU/ML (ref 0–15)

## 2022-06-03 PROCEDURE — 86200 CCP ANTIBODY: CPT | Performed by: INTERNAL MEDICINE

## 2022-06-03 PROCEDURE — 1126F PR PAIN SEVERITY QUANTIFIED, NO PAIN PRESENT: ICD-10-PCS | Mod: CPTII,S$GLB,, | Performed by: INTERNAL MEDICINE

## 2022-06-03 PROCEDURE — 3008F BODY MASS INDEX DOCD: CPT | Mod: CPTII,S$GLB,, | Performed by: INTERNAL MEDICINE

## 2022-06-03 PROCEDURE — 86431 RHEUMATOID FACTOR QUANT: CPT | Performed by: INTERNAL MEDICINE

## 2022-06-03 PROCEDURE — 3288F PR FALLS RISK ASSESSMENT DOCUMENTED: ICD-10-PCS | Mod: CPTII,S$GLB,, | Performed by: INTERNAL MEDICINE

## 2022-06-03 PROCEDURE — 36415 COLL VENOUS BLD VENIPUNCTURE: CPT | Performed by: INTERNAL MEDICINE

## 2022-06-03 PROCEDURE — 99214 OFFICE O/P EST MOD 30 MIN: CPT | Mod: S$GLB,,, | Performed by: INTERNAL MEDICINE

## 2022-06-03 PROCEDURE — 85652 RBC SED RATE AUTOMATED: CPT | Performed by: INTERNAL MEDICINE

## 2022-06-03 PROCEDURE — 3074F SYST BP LT 130 MM HG: CPT | Mod: CPTII,S$GLB,, | Performed by: INTERNAL MEDICINE

## 2022-06-03 PROCEDURE — 3008F PR BODY MASS INDEX (BMI) DOCUMENTED: ICD-10-PCS | Mod: CPTII,S$GLB,, | Performed by: INTERNAL MEDICINE

## 2022-06-03 PROCEDURE — 99214 PR OFFICE/OUTPT VISIT, EST, LEVL IV, 30-39 MIN: ICD-10-PCS | Mod: S$GLB,,, | Performed by: INTERNAL MEDICINE

## 2022-06-03 PROCEDURE — 99999 PR PBB SHADOW E&M-EST. PATIENT-LVL IV: ICD-10-PCS | Mod: PBBFAC,,, | Performed by: INTERNAL MEDICINE

## 2022-06-03 PROCEDURE — 1159F MED LIST DOCD IN RCRD: CPT | Mod: CPTII,S$GLB,, | Performed by: INTERNAL MEDICINE

## 2022-06-03 PROCEDURE — 1101F PT FALLS ASSESS-DOCD LE1/YR: CPT | Mod: CPTII,S$GLB,, | Performed by: INTERNAL MEDICINE

## 2022-06-03 PROCEDURE — 3288F FALL RISK ASSESSMENT DOCD: CPT | Mod: CPTII,S$GLB,, | Performed by: INTERNAL MEDICINE

## 2022-06-03 PROCEDURE — 1159F PR MEDICATION LIST DOCUMENTED IN MEDICAL RECORD: ICD-10-PCS | Mod: CPTII,S$GLB,, | Performed by: INTERNAL MEDICINE

## 2022-06-03 PROCEDURE — 1101F PR PT FALLS ASSESS DOC 0-1 FALLS W/OUT INJ PAST YR: ICD-10-PCS | Mod: CPTII,S$GLB,, | Performed by: INTERNAL MEDICINE

## 2022-06-03 PROCEDURE — 3078F PR MOST RECENT DIASTOLIC BLOOD PRESSURE < 80 MM HG: ICD-10-PCS | Mod: CPTII,S$GLB,, | Performed by: INTERNAL MEDICINE

## 2022-06-03 PROCEDURE — 99999 PR PBB SHADOW E&M-EST. PATIENT-LVL IV: CPT | Mod: PBBFAC,,, | Performed by: INTERNAL MEDICINE

## 2022-06-03 PROCEDURE — 3074F PR MOST RECENT SYSTOLIC BLOOD PRESSURE < 130 MM HG: ICD-10-PCS | Mod: CPTII,S$GLB,, | Performed by: INTERNAL MEDICINE

## 2022-06-03 PROCEDURE — 86140 C-REACTIVE PROTEIN: CPT | Performed by: INTERNAL MEDICINE

## 2022-06-03 PROCEDURE — 3078F DIAST BP <80 MM HG: CPT | Mod: CPTII,S$GLB,, | Performed by: INTERNAL MEDICINE

## 2022-06-03 PROCEDURE — 1126F AMNT PAIN NOTED NONE PRSNT: CPT | Mod: CPTII,S$GLB,, | Performed by: INTERNAL MEDICINE

## 2022-06-03 NOTE — PROGRESS NOTES
RHEUMATOLOGY OUTPATIENT CLINIC NOTE    6/3/2022    Attending Rheumatologist: Tristian Lauren  Primary Care Provider/Physician Requesting Consultation: Primary Doctor No   Chief Complaint/Reason For Consultation:  Osteoarthritis      Subjective:     Aubree Gupta is a 72 y.o. Black or  female with hand pain and trigger finger for follow-up visit.    No acute complaints.  Denies significant arthralgias at this time.    Review of Systems   Constitutional: Negative for fever.   Eyes: Negative for photophobia and pain.   Musculoskeletal: Negative for joint pain.   Skin: Negative for rash.   Neurological: Negative for focal weakness.       Chronic comorbid conditions affecting medical decision making today:  Past Medical History:   Diagnosis Date    Cholelithiasis     Chronic bronchitis     HBP (high blood pressure)     Heart disease     Hypothyroid     Iron deficiency anemia     Nephritis due to bacterial infection     strept    Pneumonia     Spondylosis of cervical spine      Past Surgical History:   Procedure Laterality Date    APPENDECTOMY       SECTION, LOW TRANSVERSE      CHOLECYSTECTOMY      LAPAROSCOPIC SALPINGO-OOPHORECTOMY Right     TONSILLECTOMY       Family History   Problem Relation Age of Onset    Heart disease Mother     Kidney disease Father     Breast cancer Sister     Gallbladder disease Brother         cancer     Social History     Substance and Sexual Activity   Alcohol Use Never     Social History     Tobacco Use   Smoking Status Never Smoker   Smokeless Tobacco Never Used     Social History     Substance and Sexual Activity   Drug Use Never       Current Outpatient Medications:     ADVAIR DISKUS 250-50 mcg/dose diskus inhaler, , Disp: , Rfl:     aspirin (ECOTRIN) 81 MG EC tablet, Take 81 mg by mouth., Disp: , Rfl:     azelastine (ASTELIN) 137 mcg (0.1 %) nasal spray, azelastine 137 mcg (0.1 %) nasal spray aerosol, Disp: , Rfl:     ferrous  sulfate 27 mg iron Tab, Take by mouth., Disp: , Rfl:     fluticasone propionate (FLONASE) 50 mcg/actuation nasal spray, SHAKE LQ AND U 1 SPR IEN D, Disp: , Rfl: 6    latanoprost 0.005 % ophthalmic solution, 1 drop every evening., Disp: , Rfl:     levothyroxine (SYNTHROID) 50 MCG tablet, levothyroxine 50 mcg tablet, Disp: , Rfl:     lifitegrast (XIIDRA) 5 % Dpet, Apply to eye., Disp: , Rfl:     losartan (COZAAR) 50 MG tablet, losartan 50 mg tablet, Disp: , Rfl:     meclizine (ANTIVERT) 12.5 mg tablet, , Disp: , Rfl:     methocarbamol (ROBAXIN) 500 MG Tab, methocarbamol 500 mg tablet, Disp: , Rfl:     nebivoloL (BYSTOLIC) 5 MG Tab, Bystolic 5 mg tablet, Disp: , Rfl:     ondansetron (ZOFRAN-ODT) 4 MG TbDL, ondansetron 4 mg disintegrating tablet, Disp: , Rfl:     potassium chloride SA (K-DUR,KLOR-CON) 20 MEQ tablet, potassium chloride ER 20 mEq tablet,extended release(part/cryst), Disp: , Rfl:     QUEtiapine (SEROQUEL) 50 MG tablet, quetiapine 50 mg tablet, Disp: , Rfl:     quetiapine 200 mg oral tb24 (SEROQUEL XR) 200 MG Tb24, quetiapine  mg tablet,extended release 24 hr, Disp: , Rfl:      Objective:     Vitals:    06/03/22 1442   BP: 112/69   Pulse: 64     Physical Exam   Eyes: Conjunctivae are normal.   Pulmonary/Chest: Effort normal. No respiratory distress.   Musculoskeletal:         General: No swelling or tenderness. Normal range of motion.   Skin: No rash noted.       Reviewed available old and all outside pertinent medical records available.    All lab results personally reviewed and interpreted by me.  No results found for: WBC, HGB, HCT, MCV, RDW, PLT, NEUTROABS, LYMPHOABS, EOSINOABS, NEUTROPCT, LYMPHOPCT, EOSINOPCT, DIFFTYPE, RBCMORPHOLOG, PLTEST    Lab Results   Component Value Date     07/21/2021    K 4.4 07/21/2021     07/21/2021    CO2 27 07/21/2021    GLU 84 07/21/2021    BUN 18 07/21/2021    CALCIUM 9.7 07/21/2021       No results found for: COLORU, APPEARANCEUA, SPECGRAV,  PHUR, PHUA, PROTEINUA, GLUCOSEU, KETONESU, BLOODU, LEUKOCYTESUR, NITRITE, UROBILINOGEN    No results found for: PTH    No results found for: URICACID    No results found for: CRP, ERYTHROCYTES    No results found for: ANATITER    No components found for: TSPOTTB,  QUANTIFERON     ASSESSMENT:     Chronic hand arthralgias with mechanical pattern and features of trigger finger.  Currently without significant arthralgias.  Good results with activity modification and topical therapy.  X-rays without marginal erosive changes or ankylosis.  Consider local corticosteroid injection for symptomatic relief if refractory trigger finger.  Will send baseline rheumatic evaluation for prognosis.    PLAN:     1. Osteoarthritis    2. Other specified counseling      Disclaimer: This note is prepared using voice recognition software and as such is likely to have errors and has not been proof read. Please contact me for questions.       Tristian Lauren M.D.

## 2024-11-11 ENCOUNTER — NURSE TRIAGE (OUTPATIENT)
Dept: ADMINISTRATIVE | Facility: CLINIC | Age: 74
End: 2024-11-11
Payer: COMMERCIAL
